# Patient Record
Sex: FEMALE | Race: WHITE | NOT HISPANIC OR LATINO | Employment: OTHER
[De-identification: names, ages, dates, MRNs, and addresses within clinical notes are randomized per-mention and may not be internally consistent; named-entity substitution may affect disease eponyms.]

---

## 2021-06-17 PROBLEM — E78.5 HYPERLIPIDEMIA: Status: ACTIVE | Noted: 2021-06-17

## 2021-06-17 PROBLEM — I63.9 CVA (CEREBRAL VASCULAR ACCIDENT) (HCC): Status: ACTIVE | Noted: 2021-06-17

## 2021-06-17 PROBLEM — R11.2 NON-INTRACTABLE VOMITING WITH NAUSEA: Status: ACTIVE | Noted: 2021-06-17

## 2021-06-17 PROBLEM — G43.109 COMPLICATED MIGRAINE: Status: ACTIVE | Noted: 2021-06-17

## 2021-06-17 PROBLEM — R20.0 NUMBNESS: Status: ACTIVE | Noted: 2021-06-17

## 2021-06-17 PROBLEM — R47.01 EXPRESSIVE APHASIA: Status: ACTIVE | Noted: 2021-06-17

## 2022-04-20 PROBLEM — I63.9 CVA (CEREBRAL VASCULAR ACCIDENT) (HCC): Status: RESOLVED | Noted: 2021-06-17 | Resolved: 2022-04-20

## 2022-04-20 PROBLEM — R47.01 EXPRESSIVE APHASIA: Status: RESOLVED | Noted: 2021-06-17 | Resolved: 2022-04-20

## 2023-07-20 ENCOUNTER — RESEARCH ENCOUNTER (OUTPATIENT)
Dept: RESEARCH | Facility: WORKSITE | Age: 68
End: 2023-07-20

## 2023-07-20 DIAGNOSIS — Z00.6 RESEARCH STUDY PATIENT: ICD-10-CM

## 2023-07-20 NOTE — RESEARCH NOTE
Virtual appointment completed to enroll patient into [employer] [place] [facility]. Consent form signed and mailing address confirmed.     tracking #:    [id number]

## 2023-09-04 LAB
APOB+LDLR+PCSK9 GENE MUT ANL BLD/T: NOT DETECTED
BRCA1+BRCA2 DEL+DUP + FULL MUT ANL BLD/T: NOT DETECTED
MLH1+MSH2+MSH6+PMS2 GN DEL+DUP+FUL M: NOT DETECTED

## 2023-09-05 NOTE — RESULT ENCOUNTER NOTE
We have reviewed your [employer] [place] [facility] results. They are NEGATIVE for variants associated with hereditary breast and ovarian cancer, way syndrome, and familial hypercholesterolemia. This means you are at average risk for these conditions. Please follow-up with your primary care provider or the [employer] [place] [facility] team at [phone] if you have any questions.